# Patient Record
Sex: MALE | Race: BLACK OR AFRICAN AMERICAN | NOT HISPANIC OR LATINO | Employment: FULL TIME | URBAN - METROPOLITAN AREA
[De-identification: names, ages, dates, MRNs, and addresses within clinical notes are randomized per-mention and may not be internally consistent; named-entity substitution may affect disease eponyms.]

---

## 2024-08-08 ENCOUNTER — APPOINTMENT (OUTPATIENT)
Dept: GENERAL RADIOLOGY | Facility: CLINIC | Age: 39
End: 2024-08-08
Attending: EMERGENCY MEDICINE
Payer: COMMERCIAL

## 2024-08-08 ENCOUNTER — HOSPITAL ENCOUNTER (EMERGENCY)
Facility: CLINIC | Age: 39
Discharge: HOME | End: 2024-08-09
Attending: EMERGENCY MEDICINE | Admitting: EMERGENCY MEDICINE
Payer: COMMERCIAL

## 2024-08-08 DIAGNOSIS — R00.2 PALPITATIONS: ICD-10-CM

## 2024-08-08 DIAGNOSIS — R07.89 CHEST DISCOMFORT: ICD-10-CM

## 2024-08-08 LAB
ALBUMIN SERPL BCG-MCNC: 4.4 G/DL (ref 3.5–5.2)
ALP SERPL-CCNC: 59 U/L (ref 40–150)
ALT SERPL W P-5'-P-CCNC: 23 U/L (ref 0–70)
ANION GAP SERPL CALCULATED.3IONS-SCNC: 11 MMOL/L (ref 7–15)
AST SERPL W P-5'-P-CCNC: 31 U/L (ref 0–45)
ATRIAL RATE - MUSE: 121 BPM
ATRIAL RATE - MUSE: 92 BPM
BASOPHILS # BLD AUTO: 0 10E3/UL (ref 0–0.2)
BASOPHILS NFR BLD AUTO: 1 %
BILIRUB SERPL-MCNC: 0.4 MG/DL
BUN SERPL-MCNC: 18.3 MG/DL (ref 6–20)
CALCIUM SERPL-MCNC: 9.2 MG/DL (ref 8.8–10.4)
CHLORIDE SERPL-SCNC: 102 MMOL/L (ref 98–107)
CREAT SERPL-MCNC: 1.39 MG/DL (ref 0.67–1.17)
D DIMER PPP FEU-MCNC: <0.27 UG/ML FEU (ref 0–0.5)
DIASTOLIC BLOOD PRESSURE - MUSE: NORMAL MMHG
DIASTOLIC BLOOD PRESSURE - MUSE: NORMAL MMHG
EGFRCR SERPLBLD CKD-EPI 2021: 67 ML/MIN/1.73M2
EOSINOPHIL # BLD AUTO: 0.1 10E3/UL (ref 0–0.7)
EOSINOPHIL NFR BLD AUTO: 1 %
ERYTHROCYTE [DISTWIDTH] IN BLOOD BY AUTOMATED COUNT: 12.2 % (ref 10–15)
GLUCOSE SERPL-MCNC: 111 MG/DL (ref 70–99)
HCO3 SERPL-SCNC: 26 MMOL/L (ref 22–29)
HCT VFR BLD AUTO: 40.4 % (ref 40–53)
HGB BLD-MCNC: 14.3 G/DL (ref 13.3–17.7)
IMM GRANULOCYTES # BLD: 0 10E3/UL
IMM GRANULOCYTES NFR BLD: 0 %
INTERPRETATION ECG - MUSE: NORMAL
INTERPRETATION ECG - MUSE: NORMAL
LYMPHOCYTES # BLD AUTO: 2.3 10E3/UL (ref 0.8–5.3)
LYMPHOCYTES NFR BLD AUTO: 35 %
MCH RBC QN AUTO: 28.7 PG (ref 26.5–33)
MCHC RBC AUTO-ENTMCNC: 35.4 G/DL (ref 31.5–36.5)
MCV RBC AUTO: 81 FL (ref 78–100)
MONOCYTES # BLD AUTO: 0.6 10E3/UL (ref 0–1.3)
MONOCYTES NFR BLD AUTO: 9 %
NEUTROPHILS # BLD AUTO: 3.6 10E3/UL (ref 1.6–8.3)
NEUTROPHILS NFR BLD AUTO: 54 %
NRBC # BLD AUTO: 0 10E3/UL
NRBC BLD AUTO-RTO: 0 /100
P AXIS - MUSE: 60 DEGREES
P AXIS - MUSE: 60 DEGREES
PLATELET # BLD AUTO: 190 10E3/UL (ref 150–450)
POTASSIUM SERPL-SCNC: 3.9 MMOL/L (ref 3.4–5.3)
PR INTERVAL - MUSE: 138 MS
PR INTERVAL - MUSE: 184 MS
PROT SERPL-MCNC: 7.2 G/DL (ref 6.4–8.3)
QRS DURATION - MUSE: 154 MS
QRS DURATION - MUSE: 168 MS
QT - MUSE: 352 MS
QT - MUSE: 392 MS
QTC - MUSE: 484 MS
QTC - MUSE: 499 MS
R AXIS - MUSE: -80 DEGREES
R AXIS - MUSE: -87 DEGREES
RBC # BLD AUTO: 4.98 10E6/UL (ref 4.4–5.9)
SODIUM SERPL-SCNC: 139 MMOL/L (ref 135–145)
SYSTOLIC BLOOD PRESSURE - MUSE: NORMAL MMHG
SYSTOLIC BLOOD PRESSURE - MUSE: NORMAL MMHG
T AXIS - MUSE: 48 DEGREES
T AXIS - MUSE: 64 DEGREES
TROPONIN T SERPL HS-MCNC: 7 NG/L
VENTRICULAR RATE- MUSE: 121 BPM
VENTRICULAR RATE- MUSE: 92 BPM
WBC # BLD AUTO: 6.6 10E3/UL (ref 4–11)

## 2024-08-08 PROCEDURE — 36415 COLL VENOUS BLD VENIPUNCTURE: CPT | Performed by: EMERGENCY MEDICINE

## 2024-08-08 PROCEDURE — 71046 X-RAY EXAM CHEST 2 VIEWS: CPT

## 2024-08-08 PROCEDURE — 85025 COMPLETE CBC W/AUTO DIFF WBC: CPT | Performed by: EMERGENCY MEDICINE

## 2024-08-08 PROCEDURE — 85379 FIBRIN DEGRADATION QUANT: CPT | Performed by: EMERGENCY MEDICINE

## 2024-08-08 PROCEDURE — 96360 HYDRATION IV INFUSION INIT: CPT

## 2024-08-08 PROCEDURE — 93005 ELECTROCARDIOGRAM TRACING: CPT | Mod: 76

## 2024-08-08 PROCEDURE — 258N000003 HC RX IP 258 OP 636: Performed by: EMERGENCY MEDICINE

## 2024-08-08 PROCEDURE — 84484 ASSAY OF TROPONIN QUANT: CPT | Performed by: EMERGENCY MEDICINE

## 2024-08-08 PROCEDURE — 80053 COMPREHEN METABOLIC PANEL: CPT | Performed by: EMERGENCY MEDICINE

## 2024-08-08 PROCEDURE — 99285 EMERGENCY DEPT VISIT HI MDM: CPT | Mod: 25

## 2024-08-08 PROCEDURE — 93005 ELECTROCARDIOGRAM TRACING: CPT

## 2024-08-08 RX ORDER — AMLODIPINE BESYLATE 10 MG/1
TABLET ORAL DAILY
COMMUNITY

## 2024-08-08 RX ORDER — LISDEXAMFETAMINE DIMESYLATE 20 MG/1
CAPSULE ORAL EVERY MORNING
COMMUNITY

## 2024-08-08 RX ADMIN — SODIUM CHLORIDE 500 ML: 9 INJECTION, SOLUTION INTRAVENOUS at 21:49

## 2024-08-08 ASSESSMENT — ACTIVITIES OF DAILY LIVING (ADL)
ADLS_ACUITY_SCORE: 35
ADLS_ACUITY_SCORE: 35

## 2024-08-08 ASSESSMENT — COLUMBIA-SUICIDE SEVERITY RATING SCALE - C-SSRS
1. IN THE PAST MONTH, HAVE YOU WISHED YOU WERE DEAD OR WISHED YOU COULD GO TO SLEEP AND NOT WAKE UP?: NO
6. HAVE YOU EVER DONE ANYTHING, STARTED TO DO ANYTHING, OR PREPARED TO DO ANYTHING TO END YOUR LIFE?: NO
2. HAVE YOU ACTUALLY HAD ANY THOUGHTS OF KILLING YOURSELF IN THE PAST MONTH?: NO

## 2024-08-09 VITALS
DIASTOLIC BLOOD PRESSURE: 69 MMHG | TEMPERATURE: 99.3 F | RESPIRATION RATE: 20 BRPM | HEART RATE: 80 BPM | WEIGHT: 185 LBS | OXYGEN SATURATION: 97 % | SYSTOLIC BLOOD PRESSURE: 112 MMHG

## 2024-08-09 LAB — TROPONIN T SERPL HS-MCNC: 8 NG/L

## 2024-08-09 NOTE — ED PROVIDER NOTES
"  Emergency Department Note      History of Present Illness     Chief Complaint   Chest Pain      KITTY Wellington is a 39 year old male presents with chest pain and palpitations.  Patient reports that he was in his usual state of health until 40 minutes prior to arrival.  Patient had boarded on a plane to return home to [place], [place].  While on the plane he developed sudden onset palpitations and chest discomfort noting that \"his heart felt like it was misfiring\".  Patient got off the plane and contacted EMS who transported the patient to the ED for evaluation.  Patient does have a history of congenital heart disease with repair as a young child.  He is on amlodipine and Vyvanse and does endorse vaping THC earlier today.  EMS was contacted and provided 324 mg aspirin and 1 nitro with improvement.  He is a never smoker and has no known history of coronary artery disease.  Denies recent illness, shortness of breath, or leg swelling.    Independent Historian   None    Review of External Notes   N/A    Past Medical History     Medical History and Problem List   Congenital heart disease (coarctation of aorta, VSD)  ADHD  HTN    Medications   amLODIPine (NORVASC) 10 MG tablet  lisdexamfetamine (VYVANSE) 20 MG capsule      Surgical History   Congenital heart repair    Physical Exam     Patient Vitals for the past 24 hrs:   BP Temp Temp src Pulse Resp SpO2 Weight   08/09/24 0025 -- -- -- 80 20 97 % --   08/08/24 2332 112/69 -- -- 86 24 96 % --   08/08/24 2227 -- -- -- 96 12 96 % --   08/08/24 2126 -- -- -- -- -- -- 83.9 kg (185 lb)   08/08/24 2124 126/75 99.3  F (37.4  C) Temporal -- -- -- --   08/08/24 2121 -- -- -- (!) 125 20 94 % --     Physical Exam  General: Alert and cooperative with exam. Patient in mild distress. Normal mentation.  Anxious and paranoid  Head:  Scalp is NC/AT  Eyes:  No scleral icterus, PERRL  ENT:  The external nose and ears are normal. The oropharynx is normal and without erythema; mucus membranes " are moist. Uvula midline, no evidence of deep space infection.  Neck:  Normal range of motion without rigidity.  CV:  Tachycardic rate, regular rhythm.  Midline sternal scar  Resp:  Breath sounds are clear bilaterally    Non-labored, no retractions or accessory muscle use  GI:  Abdomen is soft, no distension, no tenderness. No peritoneal signs  MS:  No lower extremity edema   Skin:  Warm and dry, No rash or lesions noted.  Neuro: Oriented x 3. No gross motor deficits.      Diagnostics     Lab Results   Labs Ordered and Resulted from Time of ED Arrival to Time of ED Departure   COMPREHENSIVE METABOLIC PANEL - Abnormal       Result Value    Sodium 139      Potassium 3.9      Carbon Dioxide (CO2) 26      Anion Gap 11      Urea Nitrogen 18.3      Creatinine 1.39 (*)     GFR Estimate 67      Calcium 9.2      Chloride 102      Glucose 111 (*)     Alkaline Phosphatase 59      AST 31      ALT 23      Protein Total 7.2      Albumin 4.4      Bilirubin Total 0.4     TROPONIN T, HIGH SENSITIVITY - Normal    Troponin T, High Sensitivity 7     D DIMER QUANTITATIVE - Normal    D-Dimer Quantitative <0.27     TROPONIN T, HIGH SENSITIVITY - Normal    Troponin T, High Sensitivity 8     CBC WITH PLATELETS AND DIFFERENTIAL    WBC Count 6.6      RBC Count 4.98      Hemoglobin 14.3      Hematocrit 40.4      MCV 81      MCH 28.7      MCHC 35.4      RDW 12.2      Platelet Count 190      % Neutrophils 54      % Lymphocytes 35      % Monocytes 9      % Eosinophils 1      % Basophils 1      % Immature Granulocytes 0      NRBCs per 100 WBC 0      Absolute Neutrophils 3.6      Absolute Lymphocytes 2.3      Absolute Monocytes 0.6      Absolute Eosinophils 0.1      Absolute Basophils 0.0      Absolute Immature Granulocytes 0.0      Absolute NRBCs 0.0         Imaging   Chest XR,  PA & LAT   Final Result   IMPRESSION: Heart size and pulmonary vascularity normal. The lungs are clear. Old left rib fractures.          EKG   ECG results from 08/08/24    EKG 12 lead     Value    Systolic Blood Pressure     Diastolic Blood Pressure     Ventricular Rate 92    Atrial Rate 92    GA Interval 184    QRS Duration 168        QTc 484    P Axis 60    R AXIS -80    T Axis 48    Interpretation ECG      Sinus rhythm  Right bundle branch block  Left anterior fascicular block  ** Bifascicular block **  Minimal voltage criteria for LVH, may be normal variant ( R in aVL )  Septal infarct , age undetermined  Abnormal ECG  No previous ECGs available  Confirmed by GENERATED REPORT, COMPUTER ([id number]),  VAN HOLLY ([id number]) on 8/8/2024 11:00:54 PM     EKG 12 lead     Value    Systolic Blood Pressure     Diastolic Blood Pressure     Ventricular Rate 121    Atrial Rate 121    GA Interval 138    QRS Duration 154        QTc 499    P Axis 60    R AXIS -87    T Axis 64    Interpretation ECG      Sinus tachycardia  Possible Left atrial enlargement  Right bundle branch block  Left anterior fascicular block  ** Bifascicular block **  Left ventricular hypertrophy with repolarization abnormality ( R in aVL , [place] )  Cannot rule out Septal infarct , age undetermined  Abnormal ECG  No previous ECGs available  Confirmed by GENERATED REPORT, COMPUTER ([id number]),  VAN HOLLY ([id number]) on 8/8/2024 11:08:55 PM           Independent Interpretation   CXR: No pneumothorax, infiltrate, or pleural effusion.    ED Course      Medications Administered   Medications   sodium chloride 0.9% BOLUS 500 mL (0 mLs Intravenous Stopped 8/8/24 2404)       Procedures   Procedures     Discussion of Management   None    ED Course        Additional Documentation  None    Medical Decision Making / Diagnosis     CMS Diagnoses: None    MIPS       None    MDM   Lovely Amish is a 39 year old male who presents via EMS for palpitations and chest discomfort.  Patient's medical history and records reviewed.  Labs, EKG, and imaging was obtained.  On initial presentation patient appears very anxious, paranoid, and  had endorsed use of THC earlier in the day.  EKG demonstrates sinus tachycardia with right bundle branch block; no evidence of acute ischemia or infarction; no previous for comparison.  Patient with history of congenital heart disease repaired as a child though no other significant cardiac history.  Lungs are clear to auscultation and chest x-ray without significant findings.  Patient was provided reassurance and IV fluids.  On reassessment tachycardia had resolved and repeat EKG continues to demonstrate no evidence of ischemia or infarction.  Patient notes resolution of symptoms.  D-dimer is undetectable; PE unlikely.  Troponin x 2 is normal; low clinical suspicion for ACS or dissection.  Labs notable only for mildly elevated creatinine (1.39; no previous for comparison).  At this time no emergent cause for patient's episode of palpitations and chest discomfort was determined though episode likely represents acute stress reaction in the setting of recent THC use.  Recommended cessation of THC use and close follow-up with his primary care doctor for recheck of kidney function when he returns home to [place].  Patient asymptomatic at discharge.    Disposition   The patient was discharged.     Diagnosis     ICD-10-CM    1. Palpitations  R00.2       2. Chest discomfort  R07.89                   Blair Bustamante,   08/09/24 1214

## 2024-08-09 NOTE — ED NOTES
Bed: ED05  Expected date: 8/8/24  Expected time: 9:20 PM  Means of arrival:   Comments:  [place] [address]

## 2024-08-09 NOTE — ED TRIAGE NOTES
Pt biba from airport for chest pain and palpitations for past 40 min - hx of HTN and congenital heart history. Pt given 324 mg asa and 1 sublingual nitroglycerin en route. Pt does endorse vaping thc prior to getting to the airport.      Triage Assessment (Adult)       Row Name 08/08/24 6277          Respiratory WDL    Respiratory WDL WDL        Cardiac WDL    Cardiac WDL X  palpitations        Cognitive/Neuro/Behavioral WDL    Cognitive/Neuro/Behavioral WDL WDL